# Patient Record
Sex: MALE | Race: WHITE
[De-identification: names, ages, dates, MRNs, and addresses within clinical notes are randomized per-mention and may not be internally consistent; named-entity substitution may affect disease eponyms.]

---

## 2019-12-23 ENCOUNTER — HOSPITAL ENCOUNTER (EMERGENCY)
Dept: HOSPITAL 52 - LL.ED | Age: 49
Discharge: HOME | End: 2019-12-23
Payer: COMMERCIAL

## 2019-12-23 VITALS — HEART RATE: 88 BPM | DIASTOLIC BLOOD PRESSURE: 86 MMHG | SYSTOLIC BLOOD PRESSURE: 137 MMHG

## 2019-12-23 DIAGNOSIS — F17.210: ICD-10-CM

## 2019-12-23 DIAGNOSIS — Z79.899: ICD-10-CM

## 2019-12-23 DIAGNOSIS — F41.8: ICD-10-CM

## 2019-12-23 DIAGNOSIS — S40.011A: ICD-10-CM

## 2019-12-23 DIAGNOSIS — Z71.6: ICD-10-CM

## 2019-12-23 DIAGNOSIS — E78.5: ICD-10-CM

## 2019-12-23 DIAGNOSIS — I10: ICD-10-CM

## 2019-12-23 DIAGNOSIS — S30.810A: Primary | ICD-10-CM

## 2019-12-23 PROCEDURE — 72114 X-RAY EXAM L-S SPINE BENDING: CPT

## 2019-12-23 PROCEDURE — 72050 X-RAY EXAM NECK SPINE 4/5VWS: CPT

## 2019-12-23 PROCEDURE — 99283 EMERGENCY DEPT VISIT LOW MDM: CPT

## 2019-12-23 PROCEDURE — 73030 X-RAY EXAM OF SHOULDER: CPT
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
